# Patient Record
Sex: MALE | Race: WHITE
[De-identification: names, ages, dates, MRNs, and addresses within clinical notes are randomized per-mention and may not be internally consistent; named-entity substitution may affect disease eponyms.]

---

## 2020-09-10 ENCOUNTER — HOSPITAL ENCOUNTER (INPATIENT)
Dept: HOSPITAL 95 - MEDS | Age: 50
LOS: 1 days | Discharge: HOME | DRG: 313 | End: 2020-09-11
Attending: INTERNAL MEDICINE | Admitting: INTERNAL MEDICINE
Payer: COMMERCIAL

## 2020-09-10 VITALS — WEIGHT: 315 LBS | BODY MASS INDEX: 44.1 KG/M2 | HEIGHT: 70.98 IN

## 2020-09-10 DIAGNOSIS — I16.0: ICD-10-CM

## 2020-09-10 DIAGNOSIS — R07.9: Primary | ICD-10-CM

## 2020-09-10 DIAGNOSIS — E78.5: ICD-10-CM

## 2020-09-10 DIAGNOSIS — E66.01: ICD-10-CM

## 2020-09-10 DIAGNOSIS — I10: ICD-10-CM

## 2020-09-10 LAB
ANION GAP SERPL CALCULATED.4IONS-SCNC: 10 MMOL/L (ref 6–16)
BASOPHILS # BLD AUTO: 0.08 K/MM3 (ref 0–0.23)
BASOPHILS NFR BLD AUTO: 1 % (ref 0–2)
BUN SERPL-MCNC: 15 MG/DL (ref 8–24)
CALCIUM SERPL-MCNC: 8.9 MG/DL (ref 8.5–10.1)
CHLORIDE SERPL-SCNC: 103 MMOL/L (ref 98–108)
CO2 SERPL-SCNC: 27 MMOL/L (ref 21–32)
CREAT SERPL-MCNC: 0.87 MG/DL (ref 0.6–1.2)
DEPRECATED RDW RBC AUTO: 48.4 FL (ref 35.1–46.3)
EOSINOPHIL # BLD AUTO: 0.23 K/MM3 (ref 0–0.68)
EOSINOPHIL NFR BLD AUTO: 2 % (ref 0–6)
ERYTHROCYTE [DISTWIDTH] IN BLOOD BY AUTOMATED COUNT: 15.5 % (ref 11.7–14.2)
GLUCOSE SERPL-MCNC: 88 MG/DL (ref 70–99)
HCT VFR BLD AUTO: 48.8 % (ref 37–53)
HGB BLD-MCNC: 15.6 G/DL (ref 13.5–17.5)
IMM GRANULOCYTES # BLD AUTO: 0.03 K/MM3 (ref 0–0.1)
IMM GRANULOCYTES NFR BLD AUTO: 0 % (ref 0–1)
LYMPHOCYTES # BLD AUTO: 2.45 K/MM3 (ref 0.84–5.2)
LYMPHOCYTES NFR BLD AUTO: 25 % (ref 21–46)
MCHC RBC AUTO-ENTMCNC: 32 G/DL (ref 31.5–36.5)
MCV RBC AUTO: 86 FL (ref 80–100)
MONOCYTES # BLD AUTO: 0.93 K/MM3 (ref 0.16–1.47)
MONOCYTES NFR BLD AUTO: 10 % (ref 4–13)
NEUTROPHILS # BLD AUTO: 6.11 K/MM3 (ref 1.96–9.15)
NEUTROPHILS NFR BLD AUTO: 62 % (ref 41–73)
NRBC # BLD AUTO: 0 K/MM3 (ref 0–0.02)
NRBC BLD AUTO-RTO: 0 /100 WBC (ref 0–0.2)
PLATELET # BLD AUTO: 286 K/MM3 (ref 150–400)
POTASSIUM SERPL-SCNC: 3.7 MMOL/L (ref 3.5–5.5)
PROTHROMBIN TIME: 11.2 SEC (ref 9.7–11.5)
SODIUM SERPL-SCNC: 140 MMOL/L (ref 136–145)

## 2020-09-10 PROCEDURE — C8929 TTE W OR WO FOL WCON,DOPPLER: HCPCS

## 2020-09-10 NOTE — NUR
1745 RECEIVED PT TO  305 VIA W/C AS DIRECT ADMIT. PT SENT HERE FROM
[place] FOR C/O CP. PT DENIED CP WHEN ADMITTED, BUT REPORTED THAT IT WAS A
LITTLE STRONGER INDIGESTION AND HEART BURN THAN NORMAL AND WANTED TO GET
CHECKED OUT. DR ORTIZ NOTIFIED REGARDING LACK OF ORDERS AND GAVE
INSTRUCTIONS TO CALL DR DING FOR ADMITTING ORDERS. DR DING NOTIFIED AND
REPORTED THAT HE HAD SENT ORDERS OVER WITH PT'S WIFE AND WOULD BE HERE SHORTLY
TO SEE PT AS WELL. OBTAINED ORDERS FROM WIFE AND MORE ORDERS PLACED BY DR DING. DR DING TO  TO SEE PT; ADDITIONAL ORDERS RECEIVED. PT PLACED ON
TELE AND TO BE STARTED ON HEPARIN DRIP FOR CP PROTOCOL; W/O BOLUS. LOVENOX TO
BE HELD WHILE PT ON HEPARIN DRIP. PT TO BE NPO AFTER MN, UNTIL SEEN BY
CARDIOLOGY. AM DOSE OF METOPROLOL TO BE HELD AS WELL, UNTIL SEEN BY
CARDIOLOGY. PT LYING HF IN BED EATING DINNER WHEN SEEN BY DR DNIG. NO C/O CP
TO PRESENT. REPORT GIVEN TO ONCOMING RN.

## 2020-09-11 LAB
ANION GAP SERPL CALCULATED.4IONS-SCNC: 7 MMOL/L (ref 6–16)
BUN SERPL-MCNC: 16 MG/DL (ref 8–24)
CALCIUM SERPL-MCNC: 8.6 MG/DL (ref 8.5–10.1)
CHLORIDE SERPL-SCNC: 102 MMOL/L (ref 98–108)
CO2 SERPL-SCNC: 29 MMOL/L (ref 21–32)
CREAT SERPL-MCNC: 0.9 MG/DL (ref 0.6–1.2)
DEPRECATED RDW RBC AUTO: 49.1 FL (ref 35.1–46.3)
ERYTHROCYTE [DISTWIDTH] IN BLOOD BY AUTOMATED COUNT: 15.5 % (ref 11.7–14.2)
GLUCOSE SERPL-MCNC: 101 MG/DL (ref 70–99)
HCT VFR BLD AUTO: 45.6 % (ref 37–53)
HGB BLD-MCNC: 14.3 G/DL (ref 13.5–17.5)
MCHC RBC AUTO-ENTMCNC: 31.4 G/DL (ref 31.5–36.5)
MCV RBC AUTO: 87 FL (ref 80–100)
NRBC # BLD AUTO: 0 K/MM3 (ref 0–0.02)
NRBC BLD AUTO-RTO: 0 /100 WBC (ref 0–0.2)
PLATELET # BLD AUTO: 272 K/MM3 (ref 150–400)
POTASSIUM SERPL-SCNC: 3.5 MMOL/L (ref 3.5–5.5)
SODIUM SERPL-SCNC: 138 MMOL/L (ref 136–145)

## 2020-09-11 NOTE — NUR
DISCHARGE NOTE
PT DISCHARGED AT 0952 AND WALKED WITH HIS WIFE. PT RECEIVED VERBAL AND
WRITTEN INSTRUCTIONS/HANDOUTS ON HIS MEDICATIONS OR ANY FOLLOW UP. IV DC'D, PT
TOLERATED WELL. PT DENIED CP/SOB/PAIN UPON DISCHARGE. DR. STANFORD STATED THAT PT
CAN TAKE IBUPROFEN OTC PRN, INSTRUCTED THE PT- PT VERBALIZED UNDERSTANDING.

## 2020-09-11 NOTE — NUR
NIGHT SHIFT SUMMARY
ALERT AND ORIENTED. DENIES CHEST PAIN OR SOB. VSS. NO ACUTE DISTRESS NOTED.
HEPARIN CURRENTLY INFUSING. APPEARED TO SLEEP MOST OF SHIFT. BED IN LOWEST
POSITION WITH CALL LIGHT IN REACH. WILL CONTINUE TO MONITOR AND REPORT TO
ONCOMING RN.

## 2020-12-03 ENCOUNTER — HOSPITAL ENCOUNTER (OUTPATIENT)
Dept: HOSPITAL 95 - LAB EV | Age: 50
Discharge: HOME | End: 2020-12-03
Payer: COMMERCIAL

## 2020-12-03 DIAGNOSIS — Z01.812: Primary | ICD-10-CM

## 2020-12-03 LAB
ANION GAP SERPL CALCULATED.4IONS-SCNC: 8 MMOL/L (ref 6–16)
BASOPHILS # BLD AUTO: 0.08 K/MM3 (ref 0–0.23)
BASOPHILS NFR BLD AUTO: 1 % (ref 0–2)
BUN SERPL-MCNC: 18 MG/DL (ref 8–24)
CALCIUM SERPL-MCNC: 8.8 MG/DL (ref 8.5–10.1)
CHLORIDE SERPL-SCNC: 102 MMOL/L (ref 98–108)
CO2 SERPL-SCNC: 29 MMOL/L (ref 21–32)
CREAT SERPL-MCNC: 1.03 MG/DL (ref 0.6–1.2)
DEPRECATED RDW RBC AUTO: 48.8 FL (ref 35.1–46.3)
EOSINOPHIL # BLD AUTO: 0.84 K/MM3 (ref 0–0.68)
EOSINOPHIL NFR BLD AUTO: 9 % (ref 0–6)
ERYTHROCYTE [DISTWIDTH] IN BLOOD BY AUTOMATED COUNT: 15.5 % (ref 11.7–14.2)
GLUCOSE SERPL-MCNC: 104 MG/DL (ref 70–99)
HCT VFR BLD AUTO: 46.7 % (ref 37–53)
HGB BLD-MCNC: 14.8 G/DL (ref 13.5–17.5)
IMM GRANULOCYTES # BLD AUTO: 0.03 K/MM3 (ref 0–0.1)
IMM GRANULOCYTES NFR BLD AUTO: 0 % (ref 0–1)
LYMPHOCYTES # BLD AUTO: 2.12 K/MM3 (ref 0.84–5.2)
LYMPHOCYTES NFR BLD AUTO: 22 % (ref 21–46)
MCHC RBC AUTO-ENTMCNC: 31.7 G/DL (ref 31.5–36.5)
MCV RBC AUTO: 87 FL (ref 80–100)
MONOCYTES # BLD AUTO: 0.95 K/MM3 (ref 0.16–1.47)
MONOCYTES NFR BLD AUTO: 10 % (ref 4–13)
NEUTROPHILS # BLD AUTO: 5.68 K/MM3 (ref 1.96–9.15)
NEUTROPHILS NFR BLD AUTO: 59 % (ref 41–73)
NRBC # BLD AUTO: 0 K/MM3 (ref 0–0.02)
NRBC BLD AUTO-RTO: 0 /100 WBC (ref 0–0.2)
PLATELET # BLD AUTO: 241 K/MM3 (ref 150–400)
POTASSIUM SERPL-SCNC: 3.9 MMOL/L (ref 3.5–5.5)
PROTHROMBIN TIME: 11.4 SEC (ref 9.7–11.5)
SODIUM SERPL-SCNC: 139 MMOL/L (ref 136–145)

## 2020-12-04 ENCOUNTER — HOSPITAL ENCOUNTER (OUTPATIENT)
Dept: HOSPITAL 95 - MHTC | Age: 50
Discharge: HOME | End: 2020-12-04
Attending: INTERNAL MEDICINE
Payer: COMMERCIAL

## 2020-12-04 VITALS — BODY MASS INDEX: 44.1 KG/M2 | HEIGHT: 70.98 IN | WEIGHT: 315 LBS

## 2020-12-04 DIAGNOSIS — Z87.891: ICD-10-CM

## 2020-12-04 DIAGNOSIS — I10: ICD-10-CM

## 2020-12-04 DIAGNOSIS — E78.5: ICD-10-CM

## 2020-12-04 DIAGNOSIS — G47.33: ICD-10-CM

## 2020-12-04 DIAGNOSIS — Z79.899: ICD-10-CM

## 2020-12-04 DIAGNOSIS — Z79.82: ICD-10-CM

## 2020-12-04 DIAGNOSIS — E66.9: ICD-10-CM

## 2020-12-04 DIAGNOSIS — I25.118: Primary | ICD-10-CM

## 2020-12-04 PROCEDURE — B205YZZ PLAIN RADIOGRAPHY OF LEFT HEART USING OTHER CONTRAST: ICD-10-PCS | Performed by: INTERNAL MEDICINE

## 2020-12-04 PROCEDURE — B201YZZ PLAIN RADIOGRAPHY OF MULTIPLE CORONARY ARTERIES USING OTHER CONTRAST: ICD-10-PCS | Performed by: INTERNAL MEDICINE

## 2020-12-04 PROCEDURE — C1769 GUIDE WIRE: HCPCS

## 2020-12-04 PROCEDURE — C1894 INTRO/SHEATH, NON-LASER: HCPCS

## 2020-12-04 PROCEDURE — 4A023N7 MEASUREMENT OF CARDIAC SAMPLING AND PRESSURE, LEFT HEART, PERCUTANEOUS APPROACH: ICD-10-PCS | Performed by: INTERNAL MEDICINE

## 2020-12-04 NOTE — NUR
BROUGHT BACK TO RECOVERY ROOM VIA RECLINER, RIGHT WRIST WITH TR BAND ON, SITE
SOFT NON TENDER WITH NO BLEEDING NOTED. AOX4. TOLERATES PO FLUIDS WITH NO
ISSUES. DENIES CP OR SOB, WILL CONTINUE TO MONITOR.

## 2020-12-04 NOTE — NUR
TR BAND DEFLATION STARTED ON RIGHT WRIST, ARM BOARD ON FOR SUPPORT. PT AOX4.
DENIES CP OR SOB. VSS. WILL CONTINUE TO MONITOR.

## 2020-12-04 NOTE — NUR
TR BAND REMOVED FROM RIGHT WRIST, RED CLOTH DOT DRESSING APPLIED. SITE SOFT
NON TENDER WITH NO ACTIVE BLEEDING, OOZING, OR PAIN. ARM BOARD ON FOR SUPPORT.
DENIES NEED FOR SLING. GETS DRESSED WITH NO NEEDED ASSISTANCE, AMBULATES WITH
STEADY GAIT. UNMEASURED VOID. WIFE CALLED ON PHONE TO NOTIFY OF  TIME.
PT VERBALIZED UNDERSTANDING OF D/C INSTRUCTIONS. PAPERWORK PROVIDED IN FOLDER,
ENCOURAGED TO FOLLOW UP WITH PROVIDER AS SCHEDULED. NO ACUTE DISTRESS NOTED AT
TIME OF DISCHARGE.

## 2021-08-25 ENCOUNTER — HOSPITAL ENCOUNTER (EMERGENCY)
Dept: HOSPITAL 95 - ER | Age: 51
LOS: 1 days | Discharge: HOME | End: 2021-08-26
Payer: COMMERCIAL

## 2021-08-25 VITALS — BODY MASS INDEX: 42.66 KG/M2 | HEIGHT: 72 IN | WEIGHT: 315 LBS

## 2021-08-25 DIAGNOSIS — Z79.899: ICD-10-CM

## 2021-08-25 DIAGNOSIS — I10: ICD-10-CM

## 2021-08-25 DIAGNOSIS — U07.1: Primary | ICD-10-CM

## 2021-08-25 DIAGNOSIS — Z79.82: ICD-10-CM

## 2021-08-25 DIAGNOSIS — B34.9: ICD-10-CM

## 2021-08-25 DIAGNOSIS — R06.00: ICD-10-CM

## 2021-08-25 DIAGNOSIS — R53.83: ICD-10-CM

## 2021-08-25 LAB
ALBUMIN SERPL BCP-MCNC: 3.4 G/DL (ref 3.4–5)
ALBUMIN/GLOB SERPL: 0.8 {RATIO} (ref 0.8–1.8)
ALT SERPL W P-5'-P-CCNC: 116 U/L (ref 12–78)
ANION GAP SERPL CALCULATED.4IONS-SCNC: 6 MMOL/L (ref 6–16)
AST SERPL W P-5'-P-CCNC: 92 U/L (ref 12–37)
BASOPHILS # BLD AUTO: 0.01 K/MM3 (ref 0–0.23)
BASOPHILS NFR BLD AUTO: 0 % (ref 0–2)
BILIRUB SERPL-MCNC: 0.6 MG/DL (ref 0.1–1)
BUN SERPL-MCNC: 11 MG/DL (ref 8–24)
CALCIUM SERPL-MCNC: 8.1 MG/DL (ref 8.5–10.1)
CHLORIDE SERPL-SCNC: 100 MMOL/L (ref 98–108)
CO2 SERPL-SCNC: 25 MMOL/L (ref 21–32)
CREAT SERPL-MCNC: 0.99 MG/DL (ref 0.6–1.2)
DEPRECATED RDW RBC AUTO: 48.3 FL (ref 35.1–46.3)
EOSINOPHIL # BLD AUTO: 0.01 K/MM3 (ref 0–0.68)
EOSINOPHIL NFR BLD AUTO: 0 % (ref 0–6)
ERYTHROCYTE [DISTWIDTH] IN BLOOD BY AUTOMATED COUNT: 16.3 % (ref 11.7–14.2)
GLOBULIN SER CALC-MCNC: 4.2 G/DL (ref 2.2–4)
GLUCOSE SERPL-MCNC: 92 MG/DL (ref 70–99)
HCT VFR BLD AUTO: 44.5 % (ref 37–53)
HGB BLD-MCNC: 14.1 G/DL (ref 13.5–17.5)
IMM GRANULOCYTES # BLD AUTO: 0.01 K/MM3 (ref 0–0.1)
IMM GRANULOCYTES NFR BLD AUTO: 0 % (ref 0–1)
LYMPHOCYTES # BLD AUTO: 1.02 K/MM3 (ref 0.84–5.2)
LYMPHOCYTES NFR BLD AUTO: 22 % (ref 21–46)
MCHC RBC AUTO-ENTMCNC: 31.7 G/DL (ref 31.5–36.5)
MCV RBC AUTO: 82 FL (ref 80–100)
MONOCYTES # BLD AUTO: 0.6 K/MM3 (ref 0.16–1.47)
MONOCYTES NFR BLD AUTO: 13 % (ref 4–13)
NEUTROPHILS # BLD AUTO: 3.09 K/MM3 (ref 1.96–9.15)
NEUTROPHILS NFR BLD AUTO: 65 % (ref 41–73)
NRBC # BLD AUTO: 0 K/MM3 (ref 0–0.02)
NRBC BLD AUTO-RTO: 0 /100 WBC (ref 0–0.2)
PLATELET # BLD AUTO: 161 K/MM3 (ref 150–400)
POTASSIUM SERPL-SCNC: 3.6 MMOL/L (ref 3.5–5.5)
PROT SERPL-MCNC: 7.6 G/DL (ref 6.4–8.2)
SODIUM SERPL-SCNC: 131 MMOL/L (ref 136–145)
TROPONIN I SERPL-MCNC: <0.015 NG/ML (ref 0–0.04)

## 2021-08-25 PROCEDURE — M0243 CASIRIVI AND IMDEVI INFUSION: HCPCS

## 2022-04-01 ENCOUNTER — HOSPITAL ENCOUNTER (OUTPATIENT)
Dept: HOSPITAL 95 - ORSCMMR | Age: 52
Discharge: HOME | End: 2022-04-01
Attending: INTERNAL MEDICINE
Payer: COMMERCIAL

## 2022-04-01 VITALS — HEIGHT: 72 IN | WEIGHT: 315 LBS | BODY MASS INDEX: 42.66 KG/M2

## 2022-04-01 DIAGNOSIS — Z87.891: ICD-10-CM

## 2022-04-01 DIAGNOSIS — E78.5: ICD-10-CM

## 2022-04-01 DIAGNOSIS — G47.33: ICD-10-CM

## 2022-04-01 DIAGNOSIS — Z79.82: ICD-10-CM

## 2022-04-01 DIAGNOSIS — E66.01: ICD-10-CM

## 2022-04-01 DIAGNOSIS — D12.5: ICD-10-CM

## 2022-04-01 DIAGNOSIS — D12.2: ICD-10-CM

## 2022-04-01 DIAGNOSIS — Z12.11: Primary | ICD-10-CM

## 2022-04-01 DIAGNOSIS — I10: ICD-10-CM

## 2022-04-01 DIAGNOSIS — K57.30: ICD-10-CM

## 2022-04-01 DIAGNOSIS — F41.8: ICD-10-CM

## 2022-04-01 DIAGNOSIS — I25.10: ICD-10-CM

## 2022-04-01 DIAGNOSIS — Z79.899: ICD-10-CM

## 2022-04-01 DIAGNOSIS — Z79.84: ICD-10-CM

## 2022-04-01 DIAGNOSIS — E11.9: ICD-10-CM

## 2022-04-01 PROCEDURE — 0DBK8ZX EXCISION OF ASCENDING COLON, VIA NATURAL OR ARTIFICIAL OPENING ENDOSCOPIC, DIAGNOSTIC: ICD-10-PCS | Performed by: INTERNAL MEDICINE

## 2022-04-01 PROCEDURE — 0DBN8ZX EXCISION OF SIGMOID COLON, VIA NATURAL OR ARTIFICIAL OPENING ENDOSCOPIC, DIAGNOSTIC: ICD-10-PCS | Performed by: INTERNAL MEDICINE

## 2022-04-01 NOTE — NUR
Ambulatory in Day Surgery
History, Chart, Medications and Allergies reviewed before start of
procedure.Patient confirms NPO status and agrees with scheduled surgery.
Patient states colon prep results clear.Lungs clear T/O to Auscultation.
Patient States Post-Procedure ride home has been arranged WITH WIFE

## 2022-07-29 ENCOUNTER — HOSPITAL ENCOUNTER (OUTPATIENT)
Dept: HOSPITAL 95 - ORSCMMR | Age: 52
Discharge: HOME | End: 2022-07-29
Attending: INTERNAL MEDICINE
Payer: COMMERCIAL

## 2022-07-29 VITALS — WEIGHT: 315 LBS | HEIGHT: 72 IN | BODY MASS INDEX: 42.66 KG/M2

## 2022-07-29 DIAGNOSIS — K57.30: ICD-10-CM

## 2022-07-29 DIAGNOSIS — Z86.010: ICD-10-CM

## 2022-07-29 DIAGNOSIS — D12.3: ICD-10-CM

## 2022-07-29 DIAGNOSIS — G47.33: ICD-10-CM

## 2022-07-29 DIAGNOSIS — D12.5: ICD-10-CM

## 2022-07-29 DIAGNOSIS — D12.2: ICD-10-CM

## 2022-07-29 DIAGNOSIS — E66.01: ICD-10-CM

## 2022-07-29 DIAGNOSIS — Z12.11: Primary | ICD-10-CM

## 2022-07-29 PROCEDURE — 0DBL8ZX EXCISION OF TRANSVERSE COLON, VIA NATURAL OR ARTIFICIAL OPENING ENDOSCOPIC, DIAGNOSTIC: ICD-10-PCS | Performed by: INTERNAL MEDICINE

## 2022-07-29 PROCEDURE — 0DBK8ZX EXCISION OF ASCENDING COLON, VIA NATURAL OR ARTIFICIAL OPENING ENDOSCOPIC, DIAGNOSTIC: ICD-10-PCS | Performed by: INTERNAL MEDICINE

## 2022-07-29 PROCEDURE — 0DBN8ZX EXCISION OF SIGMOID COLON, VIA NATURAL OR ARTIFICIAL OPENING ENDOSCOPIC, DIAGNOSTIC: ICD-10-PCS | Performed by: INTERNAL MEDICINE

## 2022-07-29 NOTE — NUR
Patient up to Ambulate independently. Gait steady.
Discharge instructions reviewed with patient. Patient verbalizes understanding.
Copy given to patient to take home.
Patient States Post-Procedure ride home has been arranged WITH WIFE.
Discharged via wheelchair to private car for ride home.

## 2022-07-29 NOTE — NUR
Ambulatory in Day Surgery.
History, Chart, Medications and Allergies reviewed before start of
procedure.Lungs clear T/O to Auscultation.
Patient confirms NPO status and agrees with scheduled surgery.
Pre-Op teaching done. Pt verbalizes understanding.
Patient States Post-Procedure ride home has been arranged.